# Patient Record
Sex: FEMALE | Race: ASIAN
[De-identification: names, ages, dates, MRNs, and addresses within clinical notes are randomized per-mention and may not be internally consistent; named-entity substitution may affect disease eponyms.]

---

## 2018-05-20 ENCOUNTER — HOSPITAL ENCOUNTER (EMERGENCY)
Dept: HOSPITAL 74 - JERFT | Age: 68
Discharge: HOME | End: 2018-05-20
Payer: COMMERCIAL

## 2018-05-20 VITALS — TEMPERATURE: 98 F | DIASTOLIC BLOOD PRESSURE: 95 MMHG | SYSTOLIC BLOOD PRESSURE: 165 MMHG | HEART RATE: 77 BPM

## 2018-05-20 VITALS — BODY MASS INDEX: 21 KG/M2

## 2018-05-20 DIAGNOSIS — M54.41: Primary | ICD-10-CM

## 2018-05-20 PROCEDURE — 3E0233Z INTRODUCTION OF ANTI-INFLAMMATORY INTO MUSCLE, PERCUTANEOUS APPROACH: ICD-10-PCS

## 2018-05-20 NOTE — PDOC
History of Present Illness





- General


Chief Complaint: Back Pain


Stated Complaint: BACK PAIN


Time Seen by Provider: 05/20/18 09:50





Past History





- Past Medical History


Allergies/Adverse Reactions: 


 Allergies











Allergy/AdvReac Type Severity Reaction Status Date / Time


 


Penicillins Allergy   Verified 05/20/18 09:16











Home Medications: 


Ambulatory Orders





Cyclobenzaprine HCl 5 mg PO HS #10 tablet 05/20/18 








COPD: No





- Suicide/Smoking/Psychosocial Hx


Smoking Status: No


Smoking History: Never smoked


Number of Cigarettes Smoked Daily: 0





*Physical Exam





- Vital Signs


 Last Vital Signs











Temp Pulse Resp BP Pulse Ox


 


 98 F   77   18   165/95   99 


 


 05/20/18 09:13  05/20/18 09:13  05/20/18 09:13  05/20/18 09:13  05/20/18 09:13














*DC/Admit/Observation/Transfer


Diagnosis at time of Disposition: 


Low back pain


Qualifiers:


 Chronicity: acute Back pain laterality: right Sciatica presence: with sciatica 

Sciatica laterality: sciatica of right side Qualified Code(s): M54.41 - Lumbago 

with sciatica, right side








- Discharge Dispostion


Disposition: HOME


Condition at time of disposition: Stable


Decision to Admit order: No





- Referrals


Referrals: 


Elisha Cisneros MD [Primary Care Provider] - 





- Patient Instructions


Printed Discharge Instructions:  DI for Low Back Pain


Additional Instructions: 


You have low back pain due to a muscle spasm. Please take ibuprofen 800 mg 3 

times a day not to exceed 3000 mg a day. You were also prescribed Flexeril. 

Please  take the medication before you go to bed. Do not drive after taking 

this medication as it may make you sleepy. You may use warm compresses on your 

back to help with her symptoms. Please follow-up with your primary care doctor. 

If your symptoms do not resolve in 3-5 days, follow-up with orthopedics. A 

referral has been provided for you.





Return to the emergency department if you have worsening back pain, bladder or 

bowel incontinence, numbness and tingling in her legs, changes in the way you 

walk, or any new or worsening symptoms.





- Post Discharge Activity

## 2018-11-27 ENCOUNTER — HOSPITAL ENCOUNTER (INPATIENT)
Dept: HOSPITAL 74 - JER | Age: 68
LOS: 3 days | Discharge: HOME | DRG: 391 | End: 2018-11-30
Attending: INTERNAL MEDICINE | Admitting: INTERNAL MEDICINE
Payer: COMMERCIAL

## 2018-11-27 VITALS — BODY MASS INDEX: 20.9 KG/M2

## 2018-11-27 DIAGNOSIS — I10: ICD-10-CM

## 2018-11-27 DIAGNOSIS — E11.9: ICD-10-CM

## 2018-11-27 DIAGNOSIS — A09: Primary | ICD-10-CM

## 2018-11-27 DIAGNOSIS — H26.9: ICD-10-CM

## 2018-11-27 DIAGNOSIS — H40.9: ICD-10-CM

## 2018-11-27 DIAGNOSIS — Z79.84: ICD-10-CM

## 2018-11-27 DIAGNOSIS — E78.5: ICD-10-CM

## 2018-11-27 DIAGNOSIS — J18.9: ICD-10-CM

## 2018-11-27 LAB
ALBUMIN SERPL-MCNC: 3.7 G/DL (ref 3.4–5)
ALP SERPL-CCNC: 117 U/L (ref 45–117)
ALT SERPL-CCNC: 53 U/L (ref 13–61)
ANION GAP SERPL CALC-SCNC: 6 MMOL/L (ref 8–16)
AST SERPL-CCNC: 32 U/L (ref 15–37)
BASOPHILS # BLD: 0.3 % (ref 0–2)
BILIRUB SERPL-MCNC: 0.4 MG/DL (ref 0.2–1)
BUN SERPL-MCNC: 7 MG/DL (ref 7–18)
CALCIUM SERPL-MCNC: 10.2 MG/DL (ref 8.5–10.1)
CHLORIDE SERPL-SCNC: 104 MMOL/L (ref 98–107)
CO2 SERPL-SCNC: 28 MMOL/L (ref 21–32)
CREAT SERPL-MCNC: 0.7 MG/DL (ref 0.55–1.3)
DEPRECATED RDW RBC AUTO: 14.9 % (ref 11.6–15.6)
EOSINOPHIL # BLD: 0.5 % (ref 0–4.5)
GLUCOSE SERPL-MCNC: 105 MG/DL (ref 74–106)
HCT VFR BLD CALC: 38.4 % (ref 32.4–45.2)
HGB BLD-MCNC: 13.2 GM/DL (ref 10.7–15.3)
LIPASE SERPL-CCNC: 95 U/L (ref 73–393)
LYMPHOCYTES # BLD: 15 % (ref 8–40)
MCH RBC QN AUTO: 29.6 PG (ref 25.7–33.7)
MCHC RBC AUTO-ENTMCNC: 34.3 G/DL (ref 32–36)
MCV RBC: 86.4 FL (ref 80–96)
MONOCYTES # BLD AUTO: 6.3 % (ref 3.8–10.2)
NEUTROPHILS # BLD: 77.9 % (ref 42.8–82.8)
PLATELET # BLD AUTO: 241 K/MM3 (ref 134–434)
PMV BLD: 9.2 FL (ref 7.5–11.1)
POTASSIUM SERPLBLD-SCNC: 4.4 MMOL/L (ref 3.5–5.1)
PROT SERPL-MCNC: 7.5 G/DL (ref 6.4–8.2)
RBC # BLD AUTO: 4.45 M/MM3 (ref 3.6–5.2)
SODIUM SERPL-SCNC: 137 MMOL/L (ref 136–145)
WBC # BLD AUTO: 14 K/MM3 (ref 4–10)

## 2018-11-27 PROCEDURE — G0378 HOSPITAL OBSERVATION PER HR: HCPCS

## 2018-11-27 NOTE — PDOC
Attending Attestation





- Resident


Resident Name: Travis Awad





- ED Attending Attestation


I have performed the following: I have examined & evaluated the patient, The 

case was reviewed & discussed with the resident, I agree w/resident's findings 

& plan





- HPI


HPI: 





11/27/18 18:59


69 yo F with a hx of DM, HLD, glaucoma, and cataracts presents to the ED with 

diffuse AP, n/v/d x 4 days. no fever. +cough. difficulty tolerating PO intake. 

no urinary sx. +sick contact, niece with recent pneumonia


flu test negative today


11/28/18 09:25








- Physicial Exam


PE: 





11/27/18 18:57





NAD, well appearing, PERRL, EOMI, dry mucus membranes. nl conjunctiva, anicteric

; neck supple. lungs clear, +tachycardic, abdomen soft diffusely tender, no 

rebound or guarding. no CVAT. MYERS x4, no focal neuro deficits. No peripheral 

edema. normal color for ethnicity, WWP.





11/27/18 19:00








- Medical Decision Making





11/27/18 18:57


69 yo F with a hx of DM, HLD, glaucoma, and cataracts presents to the ED with 

nausea, vomiting, and abdominal pain x 4 days, +cough and malaise, unable to 

mica PO.


DDx.Colitis, gastroenteritis, viral syndrome, UTI, pna. intra abdominal 

infection/inflammation. electrolyte/metabolic derangements.





Vital signs reviewed, +tachycardic. check rectal temp, normal sats and BP.


laboratory results and imaging reviewed, basic labs and lytes lipase and LFTs 

pending. EKG, CXR ordered.





ED course: IVF, hydration, zofran pepcid and maalox, tylenol, reassess. no 

focal findings, abdomen diffusely tender, if continues to be sx, CT a/p





Dispo: signed out to overnight attending and team pending reeval, labs and 

workup, reassessment and ultimate dispo.








11/28/18 09:25





11/28/18 09:27

## 2018-11-27 NOTE — PDOC
History of Present Illness





- General


Chief Complaint: Nausea/Vomiting


Stated Complaint: Nausea/Vomiting/FEVER


History Source: Patient


Exam Limitations: No Limitations





- History of Present Illness


Initial Comments: 





11/27/18 22:40


67 yo F with a hx of DM, HLD, glaucoma, and cataracts presents to the ED with 

nausea, vomiting, and abdominal pain since last Friday. She states she traveled 

to [place] by car last Wednesday and began having a sore throat thursday that 

transitioned to fever, N/V, and abdominal pain on Friday. She states she had 

diarrhea throughout the weekend that terminated yesterday. She states the 

abdominal pain is RUQ and LUQ that does not radiate that feels sharp and graded 

as a 2/10 at rest and 6/10 when coughing. Currently, she has vomiting 5x today 

without hematemesis. She had her flu vaccine given last month (she was 

influenza negative at [facility] today on visit) and endorses having recent sick 

contacts (granddaughter has pneumonia and grandson has URI). Denies the 

following: fevers, chills, visual changes, chest pain, SOB, dysuria, hematuria, 

and leg pain/swelling.





Past History





- Past Medical History


Allergies/Adverse Reactions: 


 Allergies











Allergy/AdvReac Type Severity Reaction Status Date / Time


 


Penicillins Allergy   Verified 05/20/18 09:16











Home Medications: 


Ambulatory Orders





Atorvastatin Ca [Lipitor] 10 mg PO HS 11/27/18 


Bimatoprost [Lumigan] 1 drop OU HS 11/27/18 


Cyclosporine [Restasis] 1 each OU HS 11/27/18 


Metformin HCl [Metformin HCl ER] 1,000 mg PO BID 11/27/18 








COPD: No


Diabetes: Yes (NIDM)


Hypercholesterolemia: Yes


Thyroid Disease: Yes





- Immunization History


Immunization Up to Date: Yes





- Suicide/Smoking/Psychosocial Hx


Smoking Status: No


Smoking History: Never smoked


Number of Cigarettes Smoked Daily: 0


Hx Alcohol Use: No


Drug/Substance Use Hx: No


Substance Use Type: None





*Physical Exam





- Vital Signs


 Last Vital Signs











Temp Pulse Resp BP Pulse Ox


 


 99.2 F   107 H  16   127/68   100 


 


 11/27/18 17:57  11/27/18 17:57  11/27/18 17:57  11/27/18 17:57  11/27/18 17:57














Moderate Sedation





- Procedure Monitoring


Vital Signs: 


Procedure Monitoring Vital Signs











Temperature  99.2 F   11/27/18 17:57


 


Pulse Rate  107 H  11/27/18 17:57


 


Respiratory Rate  16   11/27/18 17:57


 


Blood Pressure  127/68   11/27/18 17:57


 


O2 Sat by Pulse Oximetry (%)  100   11/27/18 17:57











ED Treatment Course





- LABORATORY


CBC & Chemistry Diagram: 


 11/27/18 17:15





 11/27/18 17:15





*DC/Admit/Observation/Transfer





- Referrals


Referrals: 


Elisha Cisneros MD [Primary Care Provider] - 





- Patient Instructions





- Post Discharge Activity

## 2018-11-28 LAB
ANION GAP SERPL CALC-SCNC: 5 MMOL/L (ref 8–16)
APPEARANCE UR: CLEAR
BASOPHILS # BLD: 0.4 % (ref 0–2)
BILIRUB UR STRIP.AUTO-MCNC: NEGATIVE MG/DL
BUN SERPL-MCNC: 5 MG/DL (ref 7–18)
CALCIUM SERPL-MCNC: 9.6 MG/DL (ref 8.5–10.1)
CHLORIDE SERPL-SCNC: 104 MMOL/L (ref 98–107)
CO2 SERPL-SCNC: 28 MMOL/L (ref 21–32)
COLOR UR: (no result)
CREAT SERPL-MCNC: 0.6 MG/DL (ref 0.55–1.3)
DEPRECATED RDW RBC AUTO: 14.9 % (ref 11.6–15.6)
EOSINOPHIL # BLD: 1.2 % (ref 0–4.5)
GLUCOSE SERPL-MCNC: 88 MG/DL (ref 74–106)
HCT VFR BLD CALC: 37.9 % (ref 32.4–45.2)
HGB BLD-MCNC: 12.1 GM/DL (ref 10.7–15.3)
KETONES UR QL STRIP: NEGATIVE
LEUKOCYTE ESTERASE UR QL STRIP.AUTO: NEGATIVE
LYMPHOCYTES # BLD: 19.8 % (ref 8–40)
MAGNESIUM SERPL-MCNC: 1.9 MG/DL (ref 1.8–2.4)
MCH RBC QN AUTO: 27.8 PG (ref 25.7–33.7)
MCHC RBC AUTO-ENTMCNC: 31.9 G/DL (ref 32–36)
MCV RBC: 87.2 FL (ref 80–96)
MONOCYTES # BLD AUTO: 10.1 % (ref 3.8–10.2)
NEUTROPHILS # BLD: 68.5 % (ref 42.8–82.8)
NITRITE UR QL STRIP: NEGATIVE
PH UR: 6 [PH] (ref 5–8)
PHOSPHATE SERPL-MCNC: 1.9 MG/DL (ref 2.5–4.9)
PLATELET # BLD AUTO: 235 K/MM3 (ref 134–434)
PMV BLD: 8.4 FL (ref 7.5–11.1)
POTASSIUM SERPLBLD-SCNC: 3.7 MMOL/L (ref 3.5–5.1)
PROT UR QL STRIP: (no result)
PROT UR QL STRIP: NEGATIVE
RBC # BLD AUTO: 4.34 M/MM3 (ref 3.6–5.2)
SODIUM SERPL-SCNC: 138 MMOL/L (ref 136–145)
SP GR UR: 1.03 (ref 1.01–1.03)
UROBILINOGEN UR STRIP-MCNC: NEGATIVE MG/DL (ref 0.2–1)
WBC # BLD AUTO: 8.7 K/MM3 (ref 4–10)

## 2018-11-28 RX ADMIN — INSULIN ASPART SCH: 100 INJECTION, SOLUTION INTRAVENOUS; SUBCUTANEOUS at 06:29

## 2018-11-28 RX ADMIN — LATANOPROST SCH DROP: 50 SOLUTION OPHTHALMIC at 21:59

## 2018-11-28 RX ADMIN — HEPARIN SODIUM SCH UNIT: 5000 INJECTION, SOLUTION INTRAVENOUS; SUBCUTANEOUS at 14:17

## 2018-11-28 RX ADMIN — SODIUM CHLORIDE SCH MLS/HR: 9 INJECTION, SOLUTION INTRAVENOUS at 11:30

## 2018-11-28 RX ADMIN — INSULIN ASPART SCH: 100 INJECTION, SOLUTION INTRAVENOUS; SUBCUTANEOUS at 19:17

## 2018-11-28 RX ADMIN — ATORVASTATIN CALCIUM SCH MG: 20 TABLET, FILM COATED ORAL at 21:59

## 2018-11-28 RX ADMIN — HEPARIN SODIUM SCH UNIT: 5000 INJECTION, SOLUTION INTRAVENOUS; SUBCUTANEOUS at 06:29

## 2018-11-28 RX ADMIN — HEPARIN SODIUM SCH UNIT: 5000 INJECTION, SOLUTION INTRAVENOUS; SUBCUTANEOUS at 21:58

## 2018-11-28 RX ADMIN — GUAIFENESIN AND DEXTROMETHORPHAN HYDROBROMIDE PRN ML: 100; 10 SOLUTION ORAL at 21:59

## 2018-11-28 RX ADMIN — INSULIN ASPART SCH: 100 INJECTION, SOLUTION INTRAVENOUS; SUBCUTANEOUS at 14:58

## 2018-11-28 NOTE — PN
Teaching Attending Note


Name of Resident: Anna Baugh





ATTENDING PHYSICIAN STATEMENT





I saw and evaluated the patient.


I reviewed the resident's note and discussed the case with the resident.


I agree with the resident's findings and plan as documented.








SUBJECTIVE:


abd pain has improved, N/v resolved. diarrhea , stopped yesterday . non 

productive cough 





OBJECTIVE:





NAD 


CV: RRR


Lungs: CTAB. 


Abd: soft, TTP in lower quadrants and suprapubic area 


ext : no edema 








ASSESSMENT AND PLAN:





69 y/o lady  who presented with N/V/D , and was found to have b/l infiltrates 





1- b/l CAP: viral vs bacterial  


- levaquin 


- check legionella Ag, and mycoplasma ABs. r/o atyrpicals 


- monitor 


- rapid flu neg, check flu PCR 





2- N/V/D : gastroenteritis vs due to PNA 


- IVf 


- liquid diet 


- advance as tolerated 


- if diarrhea recurs, check stool studies 





3- HLP : cont statin

## 2018-11-28 NOTE — EKG
Test Reason : 

Blood Pressure : ***/*** mmHG

Vent. Rate : 098 BPM     Atrial Rate : 098 BPM

   P-R Int : 152 ms          QRS Dur : 090 ms

    QT Int : 340 ms       P-R-T Axes : 068 067 029 degrees

   QTc Int : 434 ms

 

NORMAL SINUS RHYTHM

NONSPECIFIC T WAVE ABNORMALITY

ABNORMAL ECG

WHEN COMPARED WITH ECG OF 27-NOV-2018 19:55,

PREVIOUS ECG HAS UNDETERMINED RHYTHM, NEEDS REVIEW

Confirmed by AROLDO PLUNKETT, VU ([id number]) on 11/28/2018 11:42:01 AM

 

Referred By:             Confirmed By:VU KENDRICK MD

## 2018-11-28 NOTE — HP
CHIEF COMPLAINT: n/v and cough





PCP: Elisha Cisneros





HISTORY OF PRESENT ILLNESS:


Patient is a 67 y/o F w/ PMHx DM, HLD, glaucoma, cataracts, p/w 5-6 days n/v/d, 

cough, and abd pain. Just prior to onset of symptoms went on car trip to 

[place]. 1 month prior had travel to [place]. Additionally reports subjective 

fever, chills. Abd pain is sharp, non-radiating, 2/10 at rest and 6/10 when 

coughing. Had flu vaccine 1 month ago. Went to urgent care several days ago and 

reportedly had negative influenza swab. Sick contacts are granddaughter with 

PNA and grandson with URI. Denies vision changes, CP, SOB, dysuria, hematuria, 

swelling. Prior to presentation had 5 episodes NBNB vomiting which prompted 

trip to ED. On presentation tachycardic to 107, WBC 14, labs otherwise wnl. In 

ED given Levofloxacin 750 IV, duoneb, mylanta, IV tylenol, famotidine, 1L NS 

bolus, and 4mg zofran. Symptoms were controlled in ED for several hours, 

patient then had further episode of vomiting, further dose of Zofran given. EKG 

and CXR benign. Abd CT showed no abdominal pathology but identified small 

opacities in lower lung fields. Chest CT w/ contrast was ordered and is 

pending. 





ER course was notable for:


(1) tachycardia, WBC 14, low grade temp 99.2


(2) Abd CT, CXR, EKG benign


(3) Chest CT pending





Recent Travel:


As per HPI





PAST MEDICAL HISTORY:


As per HPI





PAST SURGICAL HISTORY:


As per HPI





Social History:


Smoking: none


Alcohol: none


Drugs: none





Family History:


Allergies





Penicillins Allergy (Verified 05/20/18 09:16)


 








HOME MEDICATIONS:


 Home Medications











 Medication  Instructions  Recorded


 


Atorvastatin Ca [Lipitor] 10 mg PO HS 11/27/18


 


Bimatoprost [Lumigan] 1 drop OU HS 11/27/18


 


Cyclosporine [Restasis] 1 each OU HS 11/27/18


 


Metformin HCl [Metformin HCl ER] 1,000 mg PO BID 11/27/18








REVIEW OF SYSTEMS


As per HPI








PHYSICAL EXAMINATION


 Vital Signs - 24 hr











  11/27/18





  17:57


 


Temperature 99.2 F


 


Pulse Rate 107 H


 


Respiratory 16





Rate 


 


Blood Pressure 127/68


 


O2 Sat by Pulse 100





Oximetry (%) 











GENERAL: A&Ox3, NAD


HEAD: NC/AT


EYES: PERRLA, EOMI


EARS, NOSE, THROAT: Ears normal, nares patent, oropharynx clear without 

exudates. Moist mucous membranes.


NECK: Normal range of motion, supple without lymphadenopathy, JVD, or masses.


LUNGS: Coarse bibasilar crackles


HEART: RRR no m/r/g


ABDOMEN: +bs, soft, non-distended, diffuse tenderness, no masses or organomegaly


MUSCULOSKELETAL: Normal range of motion at all joints. No bony deformities or 

tenderness. No CVA tenderness.


UPPER EXTREMITIES: 2+ pulses, warm, well-perfused. No cyanosis. No clubbing. No 

peripheral edema.


LOWER EXTREMITIES: 2+ pulses, warm, well-perfused. No calf tenderness. No 

peripheral edema. 


NEUROLOGICAL:  CNs, motor, sensory systems w/o focal deficit


PSYCHIATRIC: Cooperative. Good eye contact. Appropriate mood and affect.


SKIN: Warm, dry, normal turgor, no rashes or lesions noted, normal capillary 

refill. 





 Laboratory Results - last 24 hr











  11/27/18 11/27/18 11/28/18





  17:15 17:15 02:07


 


WBC  14.0 H  


 


RBC  4.45  


 


Hgb  13.2  


 


Hct  38.4  


 


MCV  86.4  


 


MCH  29.6  


 


MCHC  34.3  


 


RDW  14.9  


 


Plt Count  241  


 


MPV  9.2  


 


Absolute Neuts (auto)  10.9 H  


 


Neutrophils %  77.9  


 


Lymphocytes %  15.0  


 


Monocytes %  6.3  


 


Eosinophils %  0.5  


 


Basophils %  0.3  


 


Nucleated RBC %  0  


 


Sodium   137 


 


Potassium   4.4 


 


Chloride   104 


 


Carbon Dioxide   28 


 


Anion Gap   6 L 


 


BUN   7 


 


Creatinine   0.7 


 


Creat Clearance w eGFR   > 60 


 


Random Glucose   105 


 


Calcium   10.2 H 


 


Total Bilirubin   0.4 


 


AST   32 


 


ALT   53 


 


Alkaline Phosphatase   117 


 


Total Protein   7.5 


 


Albumin   3.7 


 


Lipase   95 


 


Influenza A (Rapid)    Negative


 


Influenza B (Rapid)    Negative











ASSESSMENT/PLAN:


67 y/o F w/ PMHx DM, HLD, glaucoma, cataracts, p/w 5-6 days n/v/d, cough, and 

abd pain. Elevated WBC and tachycardic on presentation. Possible lower lung 

field opacities, studies are otherwise benign thus far.





#nausea/vomiting/diarrhea/abd pain/cough


-likely viral gastroenteritis


-Chest CT pending to r/o PNA


-no indication for further ABx at this time


-trial of clear liquid diet


-1L LR at 83ml/hr, assess for further IVF needs when complete





#DM


-BGM ACHS


-SSI


-hold home metformin





#HLD


-home atorvastatin





#glaucoma


-home ophthalmologic meds





#FEN


-LR @ 83


-monitor and replete as needed


-clear liquid, advance as tolerated





#PPx


-DVT: heparin subq


-GI: pepcid/zantac PRN





#code


-full





#dispo


-obs








Visit type





- Emergency Visit


Emergency Visit: Yes


ED Registration Date: 11/28/18


Care time: The patient presented to the Emergency Department on the above date 

and was hospitalized for further evaluation of their emergent condition.





- New Patient


This patient is new to me today: Yes


Date on this admission: 11/28/18





- Critical Care


Critical Care patient: No

## 2018-11-28 NOTE — PN
Physical Exam: 


SUBJECTIVE: 


Patient 67 y/o female with a history of DM, HLD, glaucoma, cataracts who is 

here for pneumonia. Patient reports she is still nauseous and had four episodes 

of diarrhea overnight. Reports she is not feeling better but wishes to go home. 

Interpretation through patients daughters.








OBJECTIVE:





  Vital Signs











Temperature  98.6 F   11/28/18 05:19


 


Pulse Rate  89   11/28/18 05:19


 


Respiratory Rate  19   11/28/18 05:19


 


Blood Pressure  128/78   11/28/18 05:19


 


O2 Sat by Pulse Oximetry (%)  100   11/27/18 17:57




















GENERAL: The patient is awake, alert, and fully oriented, in no acute distress.


HEAD: Normal with no signs of trauma.


EYES: PERRL, extraocular movements intact, sclera anicteric, conjunctiva clear. 

No ptosis. 


ENT:  moist mucous membranes.


LUNGS: Breath sounds equal, clear to auscultation bilaterally, no wheezes, no 

crackles, no accessory muscle use. 


HEART: Regular rate and rhythm, S1, S2 without murmur, rub or gallop.


ABDOMEN: tender to light palpation


EXTREMITIES: 2+ pulses, warm, well-perfused, no edema. 


PSYCH: Normal mood, normal affect.


SKIN: Warm, dry, normal turgor, no rashes or lesions noted























Active Medications





 CBC, BMP





 11/28/18 05:10 





 11/28/18 05:10 





Atorvastatin Calcium (Lipitor -)  10 mg PO HS DENZEL


Heparin Sodium (Porcine) (Heparin -)  5,000 unit SQ TID DENZEL


   Last Admin: 11/28/18 06:29 Dose:  5,000 unit


Sodium Chloride (Normal Saline -)  1,000 mls @ 42 mls/hr IV ASDIR DENZEL


Insulin Aspart (Novolog Vial Sliding Scale -)  1 vial SQ ACHS DENZEL; Protocol


   Last Admin: 11/28/18 06:29 Dose:  Not Given


Latanoprost (Xalatan 0.005% Eye Drops -)  1 drop OU HS DENZEL


Non-Formulary Medication (Cyclosporine [Restasis])  1 each OU HS DENZEL


Ondansetron HCl (Zofran Injection)  8 mg IVPB Q8H PRN


   PRN Reason: NAUSEA











ASSESSMENT/PLAN:


Patient 67 y/o female with a history of DM, HLD, glaucoma, cataracts who is 

here for pneumonia.





#Pneumonia


   - Abd CT: mild L adrenal thickening, small opacities at lower lung field


   - Chest CT: patchy infiltrate identified within right upper lobe, right 

middle lobe, and both bases, no pleural effusion or pneumothorax


   - received one time dose levofloxacin in ED


   - Azithromycin 500 mg today, 250 mg tomorrow


   - Ceftriaxone 1 gm q 24, ( Pt PCN allg is nausea, <5% crossover reaction 

with cephalosporin)


   - Begin Levaquin tomorrow


   - zofran 4mg q8h for nausea 


   - f/u bcx and urine cx


   - f/u flu swab, f/u urine antigens for pneumonia





#DM


   - SS


   - BGM





#glaucoma


   -  continue cyclosporine and latanaprost





#DVT ppx


   - heparin TID





#hyperlipidmeia


   - atorvastatin 20 mg po hs





#FEN


   - low phosp 1.9, replete wit packet


   - NS @ 42





Dispo: f/u tomorrow 





Visit type





- Emergency Visit


Emergency Visit: No





- New Patient


This patient is new to me today: Yes


Date on this admission: 11/28/18





- Critical Care


Critical Care patient: No

## 2018-11-28 NOTE — PN
Teaching Attending Note


Name of Resident: Casper Benoit





ATTENDING PHYSICIAN STATEMENT





I saw and evaluated the patient.


I reviewed the resident's note and discussed the case with the resident.


I agree with the resident's findings and plan as documented.








SUBJECTIVE:


Seen and examined; please refer to resident's note for additional historical 

information.  In summation this is a 69 y/o female presenting to the hospital 

from home with her daughter for several days of nausea/vomitting/diarrhea which 

has been worsening over the past week when she returned from a road trip to [place].

  Furthermore she was in [place] the prior month but had none of these sx when 

there.  No bone pain, no rash, no conjunctuvitis.  Vomiting is bilious.  

Diarrhea is loose to watery. Some occasional abdominal pain worse when she 

coughs; that pain is diffuse throughout her abdomen.  Per the daughter she gets 

these sx yearly and will come to the ER get abx and get sent home.  Initially 

tachy on presentation to 107 with WBC 14; CT abdomen and pelvis done in ER was 

not revealing for intraabdominal pathology but did recommend CT chest given the 

findings of small opacities at the b/l lower lung fields; this is pending.  She 

is currently hemodynamically stable, afebrile, not requiring O2.  Admission 

requested to the medicine service. 





10 sys ROS done and negative aside from HPI


PMH and PSH reviewed


FH asked and noncontributory


Socially non-smoker, non-drinker, travel hx discussed








OBJECTIVE:


Vs, labs, imaging reviewed


NAD, AAOx3, resting in bed


RRR s1/2 no mgr


Lungs CTAB with sym expansion


NT ND +BS


CN2-12 grossly intact


NC AT EOMI PERRLA





Labs show WBC 14 with increased ANC, negative flu wab, unremarkable chemistry, 

unremarkable UA.  Rest of CBC unremarkable.  


CT chest pending


CT ab/pelv shows small opacities in the b/l lower lungs, thickened L-adrenal 

gland medial limb








ASSESSMENT AND PLAN:


Mrs. Still is a 69 y/o presenting with cough, nausea, vomitting, diarrhea, 

with a recent travel history; found to have b/l opacities in the lower lungs 

with CT pending





1) Bilateral Lower Lung Opacities, potential pneumonia


-CXR nonrevealing but CT pending; seen on initial CT abdomen and pelvis. 


-Flu swab negative; will check sputum cx, followup blood cx.


-Empiric Ceftriaxone/azithromycin until we confirm the presence or abscence of 

infiltrative findings; would thus tx for CAP.  This could, however, be a viral 

syndrome.





2) Nausea/Vomitting/Diarrhea


-Could potentially be due to viral syndrome vs. referred issues from #1 if 

indeed pneumonia


-Check lactoferrin, she is low risk for Cdif but if she has profuse diarrhea 

indicative of Cdif colitis will check an assay


-If she has severe diarrhea here, send for parasite exam, culture.  At risk due 

to travel to [place]


-Clear liquid diet; maintenance LR until established that she can tolerate 

food.  PRN zofran





3) Glaucoma


-Continue home medications





4) DM


-Hold PO meds; give ssi





5) HLD


-Continue home statin; check lipids OP





Full Code

## 2018-11-28 NOTE — PDOC
*Physical Exam





- Vital Signs


 Last Vital Signs











Temp Pulse Resp BP Pulse Ox


 


 99.2 F   107 H  16   127/68   100 


 


 11/27/18 17:57  11/27/18 17:57  11/27/18 17:57  11/27/18 17:57  11/27/18 17:57














- Physical Exam


General Appearance: Yes: Nourished


Neck: positive: Trachea midline


Respiratory/Chest: positive: Other (faint crackles bilat bases, normal effort)


Cardiovascular: positive: Regular Rhythm, Regular Rate, S1, S2


Gastrointestinal/Abdominal: positive: Normal Bowel Sounds, Flat, Soft.  negative

: Tender





**Heart Score/ECG Review


  ** #1


General ECG Interpretation: Sinus Rhythm, Normal Rate (98), Normal Intervals, 

No acute ischemic changes


Compared to previous ECG there are: No significant change





11/28/18 01:54


TWI III, flattened AVF. 





ED Treatment Course





- LABORATORY


CBC & Chemistry Diagram: 


 11/27/18 17:15





 11/27/18 17:15





- ADDITIONAL ORDERS


Additional order review: 


 Laboratory  Results











  11/27/18





  17:15


 


Sodium  137


 


Potassium  4.4


 


Chloride  104


 


Carbon Dioxide  28


 


Anion Gap  6 L


 


BUN  7


 


Creatinine  0.7


 


Creat Clearance w eGFR  > 60


 


Random Glucose  105


 


Calcium  10.2 H


 


Total Bilirubin  0.4


 


AST  32


 


ALT  53


 


Alkaline Phosphatase  117


 


Total Protein  7.5


 


Albumin  3.7


 


Lipase  95








 











  11/27/18





  17:15


 


RBC  4.45


 


MCV  86.4


 


MCHC  34.3


 


RDW  14.9


 


MPV  9.2


 


Neutrophils %  77.9


 


Lymphocytes %  15.0


 


Monocytes %  6.3


 


Eosinophils %  0.5


 


Basophils %  0.3














- Medications


Given in the ED: 


ED Medications














Discontinued Medications














Generic Name Dose Route Start Last Admin





  Trade Name Freddyq  PRN Reason Stop Dose Admin


 


Acetaminophen  1,000 mg  11/27/18 18:46  11/27/18 19:51





  Ofirmev Injection -  IVPB  11/27/18 18:47  1,000 mg





  ONCE ONE   Administration





     





     





     





     


 


Al Hydroxide/Mg Hydroxide  30 ml  11/27/18 18:46  11/27/18 19:51





  Mylanta Oral Suspension -  PO  11/27/18 18:47  30 ml





  ONCE ONE   Administration





     





     





     





     


 


Famotidine/Sodium Chloride  20 mg in 50 mls @ 100 mls/hr  11/27/18 18:46  11/27/ 18 19:52





  Pepcid 20 Mg Premixed Ivpb -  IVPB  11/27/18 19:15  100 mls/hr





  ONCE ONE   Administration





     





     





     





     


 


Sodium Chloride  1,000 mls @ 1,000 mls/hr  11/27/18 18:46  11/27/18 19:51





  Normal Saline -  IV  11/27/18 19:45  1,000 mls/hr





  ASDIR STA   Administration





     





     





     





     


 


Ondansetron HCl  4 mg  11/27/18 18:47  11/27/18 19:52





  Zofran Injection  IVPUSH  11/27/18 18:48  4 mg





  ONCE ONE   Administration





     





     





     





     














Medical Decision Making





- Medical Decision Making





11/28/18 01:22


69 yo F wit h/o DM , second hand smoke exposure, here with c/o n/v x 4 days.  

last emesis was today at 4 pm.  has had cough, subjective chills. no abd pain.  

pt was seen at urgent care few days ago, had negative flu swab.  


ws signed out to me awaiting imaging, and reasessment.  pt states has not 

tolerate po .  ct a/p negative for abd pathology. noted acute . vs chronic 

inflammatory vs. infectious change at lung bases.


will treat with neb, iv abx.  due DM, elevated WBC 14, trial po. admit for iv 

abx. 


11/28/18 01:55


d/w with dr. mcduffie, will admit to hospitalist





*DC/Admit/Observation/Transfer


Diagnosis at time of Disposition: 


 Intractable vomiting, Pneumonia








- Discharge Dispostion


Decision to Admit order: Yes





- Referrals


Referrals: 


Elisha Cisneros MD [Primary Care Provider] - 





- Patient Instructions





- Post Discharge Activity

## 2018-11-29 LAB
ANION GAP SERPL CALC-SCNC: 7 MMOL/L (ref 8–16)
BUN SERPL-MCNC: 3 MG/DL (ref 7–18)
CALCIUM SERPL-MCNC: 9.8 MG/DL (ref 8.5–10.1)
CHLORIDE SERPL-SCNC: 110 MMOL/L (ref 98–107)
CO2 SERPL-SCNC: 26 MMOL/L (ref 21–32)
CREAT SERPL-MCNC: 0.6 MG/DL (ref 0.55–1.3)
DEPRECATED RDW RBC AUTO: 15.1 % (ref 11.6–15.6)
GLUCOSE SERPL-MCNC: 82 MG/DL (ref 74–106)
HCT VFR BLD CALC: 35.1 % (ref 32.4–45.2)
HGB BLD-MCNC: 11 GM/DL (ref 10.7–15.3)
MCH RBC QN AUTO: 27.8 PG (ref 25.7–33.7)
MCHC RBC AUTO-ENTMCNC: 31.4 G/DL (ref 32–36)
MCV RBC: 88.4 FL (ref 80–96)
PLATELET # BLD AUTO: 226 K/MM3 (ref 134–434)
PMV BLD: 8.9 FL (ref 7.5–11.1)
POTASSIUM SERPLBLD-SCNC: 3.7 MMOL/L (ref 3.5–5.1)
RBC # BLD AUTO: 3.96 M/MM3 (ref 3.6–5.2)
SODIUM SERPL-SCNC: 143 MMOL/L (ref 136–145)
WBC # BLD AUTO: 7.1 K/MM3 (ref 4–10)

## 2018-11-29 RX ADMIN — SODIUM CHLORIDE SCH MLS/HR: 9 INJECTION, SOLUTION INTRAVENOUS at 02:25

## 2018-11-29 RX ADMIN — INSULIN ASPART SCH: 100 INJECTION, SOLUTION INTRAVENOUS; SUBCUTANEOUS at 11:55

## 2018-11-29 RX ADMIN — HEPARIN SODIUM SCH UNIT: 5000 INJECTION, SOLUTION INTRAVENOUS; SUBCUTANEOUS at 21:13

## 2018-11-29 RX ADMIN — GUAIFENESIN AND DEXTROMETHORPHAN HYDROBROMIDE PRN ML: 100; 10 SOLUTION ORAL at 14:37

## 2018-11-29 RX ADMIN — LATANOPROST SCH DROP: 50 SOLUTION OPHTHALMIC at 21:14

## 2018-11-29 RX ADMIN — HEPARIN SODIUM SCH UNIT: 5000 INJECTION, SOLUTION INTRAVENOUS; SUBCUTANEOUS at 06:03

## 2018-11-29 RX ADMIN — GUAIFENESIN AND DEXTROMETHORPHAN HYDROBROMIDE PRN ML: 100; 10 SOLUTION ORAL at 21:14

## 2018-11-29 RX ADMIN — INSULIN ASPART SCH: 100 INJECTION, SOLUTION INTRAVENOUS; SUBCUTANEOUS at 16:35

## 2018-11-29 RX ADMIN — SODIUM CHLORIDE SCH MLS/HR: 9 INJECTION, SOLUTION INTRAVENOUS at 14:39

## 2018-11-29 RX ADMIN — HEPARIN SODIUM SCH UNIT: 5000 INJECTION, SOLUTION INTRAVENOUS; SUBCUTANEOUS at 14:41

## 2018-11-29 RX ADMIN — INSULIN ASPART SCH: 100 INJECTION, SOLUTION INTRAVENOUS; SUBCUTANEOUS at 06:17

## 2018-11-29 RX ADMIN — ATORVASTATIN CALCIUM SCH MG: 20 TABLET, FILM COATED ORAL at 21:14

## 2018-11-29 NOTE — PN
Teaching Attending Note


Name of Resident: Anna Baugh





ATTENDING PHYSICIAN STATEMENT





I saw and evaluated the patient.


I reviewed the resident's note and discussed the case with the resident.


I agree with the resident's findings and plan as documented.








SUBJECTIVE:


No fever or chills. has mild nausea still but no diarrhea, or vomiting. abd 

pain has improved . had fever last night 





OBJECTIVE:


NAD 


CV: RRR


Lungs: CTAB. 


Abd: soft, TTP in all quadrants


Ext : no edema 








ASSESSMENT AND PLAN:





69 y/o lady  who presented with N/V/D , and was found to have b/l infiltrates 





1- b/l CAP: viral vs bacterial  . monitor fever curve 


- cont levaquin day 2


- legionella Ag ( neg ) , follow mycoplasma ABs.


- follow flu PCR 





2- N/V/D: gastroenteritis vs due to PNA 


- IVf 


-advance diet  


- if diarrhea recurs, check stool studies 





3- HLP : cont statin











possible dc tomorrow

## 2018-11-29 NOTE — PN
Physical Exam: 


SUBJECTIVE:


Patient 69 y/o female with a history of DM, HLD, glaucoma, cataracts who is 

here for pneumonia. Patient reports she is still nauseous, but denies vomiting 

or diarrhea overnight. Patient had temperature overnight and was given tylenol. 








OBJECTIVE:


 Vital Signs











Temperature  98 F   11/29/18 13:49


 


Pulse Rate  89   11/29/18 13:49


 


Respiratory Rate  18   11/29/18 13:49


 


Blood Pressure  118/73   11/29/18 13:49


 


O2 Sat by Pulse Oximetry (%)  97   11/29/18 03:00














GENERAL: The patient is awake, alert, and fully oriented, in no acute distress.


HEAD: Normal with no signs of trauma.


EYES: PERRL, extraocular movements intact, sclera anicteric, conjunctiva clear. 

No ptosis. 


ENT:  moist mucous membranes.


LUNGS: Breath sounds equal, clear to auscultation bilaterally, no wheezes, no 

crackles, no accessory muscle use. 


HEART: Regular rate and rhythm, S1, S2 without murmur, rub or gallop.


ABDOMEN: tender to light palpation


EXTREMITIES: 2+ pulses, warm, well-perfused, no edema. 


PSYCH: Normal mood, normal affect.


SKIN: Warm, dry, normal turgor, no rashes or lesions noted











 Laboratory Results - last 24 hr


 CBC, BMP





 11/29/18 06:30 





 11/29/18 06:30 




















Active Medications





Atorvastatin Calcium (Lipitor -)  20 mg PO HS DENZEL


   Last Admin: 11/28/18 21:59 Dose:  20 mg


Guaifenesin (Diabetic Tussin Dm -)  5 ml PO Q4H PRN


   PRN Reason: COUGH


   Last Admin: 11/28/18 21:59 Dose:  5 ml


Heparin Sodium (Porcine) (Heparin -)  5,000 unit SQ TID DENZEL


   Last Admin: 11/29/18 06:03 Dose:  5,000 unit


Sodium Chloride (Normal Saline -)  1,000 mls @ 42 mls/hr IV ASDIR DENZEL


   Last Admin: 11/29/18 02:25 Dose:  42 mls/hr


Insulin Aspart (Novolog Vial Sliding Scale -)  1 vial SQ TIDAC [facility]; Protocol


Latanoprost (Xalatan 0.005% Eye Drops -)  1 drop OU HS DENZEL


   Last Admin: 11/28/18 21:59 Dose:  1 drop


Non-Formulary Medication (Cyclosporine [Restasis])  1 each OU HS DENZEL


Ondansetron HCl (Zofran Injection)  4 mg IVPB Q8H PRN


   PRN Reason: NAUSEA


   Last Admin: 11/29/18 08:24 Dose:  4 mg











ASSESSMENT/PLAN:


Patient 69 y/o female with a history of DM, HLD, glaucoma, cataracts who is 

here for pneumonia.





#Pneumonia


   - Abd CT: mild L adrenal thickening, small opacities at lower lung field


   - Chest CT: patchy infiltrate identified within right upper lobe, right 

middle lobe, and both bases, no pleural effusion or pneumothorax


   - continue Levaquin, day 3 of abs


   - zofran 4mg q8h for nausea 


   - f/u bcx and urine cx, no growth yet 


   - RSV swab pending, urnie antigen pending





#DM


   - SS, loose do not give insulin if sugar 200 


   - BGM





#glaucoma


   -  continue cyclosporine and latanaprost





#DVT ppx


   - heparin TID





#hyperlipidmeia


   - atorvastatin 20 mg po hs





#FEN


   - NS @ 42


   - diabetic diet





Dispo: f/u tomorrow 








Visit type





- Emergency Visit


Emergency Visit: No





- New Patient


This patient is new to me today: No





- Critical Care


Critical Care patient: No





- Discharge Referral


Referred to [facility] No

## 2018-11-30 VITALS — SYSTOLIC BLOOD PRESSURE: 154 MMHG | DIASTOLIC BLOOD PRESSURE: 82 MMHG | HEART RATE: 92 BPM

## 2018-11-30 VITALS — TEMPERATURE: 98.1 F

## 2018-11-30 RX ADMIN — GUAIFENESIN AND DEXTROMETHORPHAN HYDROBROMIDE PRN ML: 100; 10 SOLUTION ORAL at 01:06

## 2018-11-30 RX ADMIN — HEPARIN SODIUM SCH: 5000 INJECTION, SOLUTION INTRAVENOUS; SUBCUTANEOUS at 14:17

## 2018-11-30 RX ADMIN — Medication SCH EACH: at 01:04

## 2018-11-30 RX ADMIN — INSULIN ASPART SCH: 100 INJECTION, SOLUTION INTRAVENOUS; SUBCUTANEOUS at 06:21

## 2018-11-30 RX ADMIN — HEPARIN SODIUM SCH UNIT: 5000 INJECTION, SOLUTION INTRAVENOUS; SUBCUTANEOUS at 06:15

## 2018-11-30 RX ADMIN — INSULIN ASPART SCH: 100 INJECTION, SOLUTION INTRAVENOUS; SUBCUTANEOUS at 12:13

## 2018-11-30 NOTE — DS
Physical Exam: 


SUBJECTIVE:


Patient 67 y/o female with a history of DM, HLD, glaucoma, cataracts who is 

here for pneumonia. Patient reports she is breathing better, and has not had 

any episodes of nausea or vomiting. No acute events overnight. 








OBJECTIVE:





  Vital Signs











Temperature  98.1 F   11/30/18 06:00


 


Pulse Rate  80   11/30/18 06:00


 


Respiratory Rate  18   11/30/18 06:00


 


Blood Pressure  110/60   11/30/18 06:00


 


O2 Sat by Pulse Oximetry (%)  98   11/29/18 21:00

















PHYSICAL EXAM





GENERAL: The patient is awake, alert, and fully oriented, in no acute distress.


HEAD: Normal with no signs of trauma.


EYES: PERRL, extraocular movements intact, sclera anicteric, conjunctiva clear. 

No ptosis. 


ENT:  moist mucous membranes.


LUNGS: Breath sounds equal, clear to auscultation bilaterally, no wheezes, no 

crackles, no accessory muscle use. 


HEART: Regular rate and rhythm, S1, S2 without murmur, rub or gallop.


ABDOMEN: tender to light palpation


EXTREMITIES: 2+ pulses, warm, well-perfused, no edema. 


PSYCH: Normal mood, normal affect.


SKIN: Warm, dry, normal turgor, no rashes or lesions noted





LABS


 Laboratory Results - last 24 hr











  11/29/18 11/29/18 11/30/18





  16:34 21:24 06:17


 


POC Glucometer  93  111  87














  11/30/18





  12:06


 


POC Glucometer  83











HOSPITAL COURSE:





Date of Admission:11/28/18





Patient was admitted for pneumonia.  Patient treated with IV antibiotics. 

Patients symptoms improved, vitals stable. Patient instructed to follow up with 

her PCP and have a repeat CT in 6 weeks.





blood cx: negative


Ucx: negative


RSV: negative


Salmonella/Shigella : non lactose fermenting gnb


Yersnia, Vibrio, Campylobacter all negative


Abd CT: mild L adrenal thickening, small opacities at lower lung field


Chest CT: patchy infiltrate identified within right upper lobe, right middle 

lobe, and both bases, no pleural effusion or pneumothorax





Date of Discharge: 11/30/18











Minutes to complete discharge: 35





Discharge Summary


Reason For Visit: INTRACTABLE VOMITING,PNEUMONIA


Condition: Improved





- Instructions


Diet, Activity, Other Instructions: 


You were admitted to the hospital for a pneumonia. We treated this pneumonia 

with antibiotics.





To complete treatment of your pneumonia please take:


Levaquin 500 mg daily  by mouth for two more days





Please have a Chest CT done in 6 weeks to follow up imaging of your lungs.





Please resume your home medications as prescribed. 





Please follow up with your primary care physician within one week.





Please return to the Emergency Department if you have shortness of breath, fever

, chest pain, headache, nausea, or vomiting.


Referrals: 


Elisha Cisneros MD [Primary Care Provider] - 


Disposition: HOME





- Home Medications


Comprehensive Discharge Medication List: 


Ambulatory Orders





Bimatoprost [Lumigan] 1 drop OU HS 11/27/18 


Atorvastatin Ca [Lipitor] 20 mg PO DAILY 11/28/18 


Dorzolamide HCl/Timolol Maleat [Cosopt Eye Drops] 10 ml OU DAILY 11/28/18 


Metformin HCl [Metformin HCl ER] 500 mg PO BID 11/28/18 


Miscellaneous Medical Supply [Outpatient Order] 1 each  ASDIR #1 misc 11/30/ 18 


levoFLOXacin [Levaquin -] 500 mg PO DAILY@0600 #2 tablet 11/30/18 








This patient is new to me today: No


Emergency Visit: No


Critical Care patient: No





- Discharge Referral


Referred to [facility] No

## 2018-11-30 NOTE — PN
Teaching Attending Note


Name of Resident: Anna Baugh





ATTENDING PHYSICIAN STATEMENT





I saw and evaluated the patient.


I reviewed the resident's note and discussed the case with the resident.


I agree with the resident's findings and plan as documented.








SUBJECTIVE:


No fever or chills. No abd pain , no N/V/d . mild no n productive cough 





OBJECTIVE:


NAD 


CV: RRR


Lungs:  L mid lung field crackles 


Abd: soft, TTP in all quadrants


Ext : no edema 








ASSESSMENT AND PLAN:





67 y/o lady  who presented with N/V/D , and was found to have b/l infiltrates 





1- b/l CAP: viral vs bacterial. resolution of fever 


- cont levaquin day 3/5. switch to po 


- legionella Ag ( neg ) , mycoplasma ABs. still pending 


- Flu PCR pending 


- need CT of chest in 6 weeks. 





2- N/V/D: gastroenteritis vs due to PNA 


- resolved. 


- stool cx with  G- organisms . could be connie. any way she is on levaquin and 

her diarrhea stopped 





3- HLP: cont statin. 








DC home,. plan d/w he rand her son at bed side

## 2021-04-21 ENCOUNTER — HOSPITAL ENCOUNTER (EMERGENCY)
Dept: HOSPITAL 74 - JER | Age: 71
Discharge: HOME | End: 2021-04-21
Payer: COMMERCIAL

## 2021-04-21 VITALS — SYSTOLIC BLOOD PRESSURE: 150 MMHG | TEMPERATURE: 98.1 F | HEART RATE: 81 BPM | DIASTOLIC BLOOD PRESSURE: 85 MMHG

## 2021-04-21 VITALS — BODY MASS INDEX: 21.9 KG/M2

## 2021-04-21 DIAGNOSIS — R07.9: Primary | ICD-10-CM

## 2021-04-21 LAB
ALBUMIN SERPL-MCNC: 4.4 G/DL (ref 3.4–5)
ALP SERPL-CCNC: 102 U/L (ref 45–117)
ALT SERPL-CCNC: 39 U/L (ref 13–61)
ANION GAP SERPL CALC-SCNC: 6 MMOL/L (ref 8–16)
AST SERPL-CCNC: 21 U/L (ref 15–37)
BASOPHILS # BLD: 0.4 % (ref 0–2)
BILIRUB SERPL-MCNC: 0.3 MG/DL (ref 0.2–1)
BUN SERPL-MCNC: 11.8 MG/DL (ref 7–18)
CALCIUM SERPL-MCNC: 12.6 MG/DL (ref 8.5–10.1)
CHLORIDE SERPL-SCNC: 104 MMOL/L (ref 98–107)
CO2 SERPL-SCNC: 29 MMOL/L (ref 21–32)
CREAT SERPL-MCNC: 0.8 MG/DL (ref 0.55–1.3)
DEPRECATED RDW RBC AUTO: 14.9 % (ref 11.6–15.6)
EOSINOPHIL # BLD: 3.4 % (ref 0–4.5)
GLUCOSE SERPL-MCNC: 86 MG/DL (ref 74–106)
HCT VFR BLD CALC: 39.5 % (ref 32.4–45.2)
HGB BLD-MCNC: 12.9 GM/DL (ref 10.7–15.3)
LYMPHOCYTES # BLD: 26.2 % (ref 8–40)
MCH RBC QN AUTO: 28.8 PG (ref 25.7–33.7)
MCHC RBC AUTO-ENTMCNC: 32.8 G/DL (ref 32–36)
MCV RBC: 87.9 FL (ref 80–96)
MONOCYTES # BLD AUTO: 5.9 % (ref 3.8–10.2)
NEUTROPHILS # BLD: 64.1 % (ref 42.8–82.8)
PLATELET # BLD AUTO: 283 K/MM3 (ref 134–434)
PMV BLD: 9.5 FL (ref 7.5–11.1)
PROT SERPL-MCNC: 8.6 G/DL (ref 6.4–8.2)
RBC # BLD AUTO: 4.49 M/MM3 (ref 3.6–5.2)
SODIUM SERPL-SCNC: 139 MMOL/L (ref 136–145)
WBC # BLD AUTO: 13 K/MM3 (ref 4–10)

## 2022-06-20 ENCOUNTER — RX ONLY (RX ONLY)
Age: 72
End: 2022-06-20

## 2022-06-20 ENCOUNTER — OFFICE (OUTPATIENT)
Dept: URBAN - METROPOLITAN AREA CLINIC 30 | Facility: CLINIC | Age: 72
Setting detail: OPHTHALMOLOGY
End: 2022-06-20
Payer: MEDICARE

## 2022-06-20 DIAGNOSIS — H40.1132: ICD-10-CM

## 2022-06-20 DIAGNOSIS — Z96.1: ICD-10-CM

## 2022-06-20 DIAGNOSIS — E11.9: ICD-10-CM

## 2022-06-20 DIAGNOSIS — H47.233: ICD-10-CM

## 2022-06-20 PROCEDURE — 92083 EXTENDED VISUAL FIELD XM: CPT | Performed by: OPHTHALMOLOGY

## 2022-06-20 PROCEDURE — 76514 ECHO EXAM OF EYE THICKNESS: CPT | Performed by: OPHTHALMOLOGY

## 2022-06-20 PROCEDURE — 92004 COMPRE OPH EXAM NEW PT 1/>: CPT | Performed by: OPHTHALMOLOGY

## 2022-06-20 PROCEDURE — 92020 GONIOSCOPY: CPT | Performed by: OPHTHALMOLOGY

## 2022-06-20 ASSESSMENT — REFRACTION_MANIFEST
OD_SPHERE: -1.00
OD_CYLINDER: +0.50
OS_VA1: 20/30-2
OS_AXIS: 75
OD_VA1: 20/25-1
OS_CYLINDER: +0.75
OD_AXIS: 140
OS_SPHERE: -1.50

## 2022-06-20 ASSESSMENT — PACHYMETRY
OD_CT_CORRECTION: -1
OD_CT_UM: 557
OS_CT_CORRECTION: -1
OS_CT_UM: 557

## 2022-06-20 ASSESSMENT — SPHEQUIV_DERIVED
OD_SPHEQUIV: -0.75
OS_SPHEQUIV: -1.125
OS_SPHEQUIV: -1.125
OD_SPHEQUIV: -0.75

## 2022-06-20 ASSESSMENT — REFRACTION_AUTOREFRACTION
OS_CYLINDER: +0.75
OD_CYLINDER: +0.50
OD_SPHERE: -1.00
OS_SPHERE: -1.50
OS_AXIS: 75
OD_AXIS: 140

## 2022-06-20 ASSESSMENT — TONOMETRY
OS_IOP_MMHG: 19
OD_IOP_MMHG: 17

## 2022-06-20 ASSESSMENT — VISUAL ACUITY
OS_BCVA: 20/40-2
OD_BCVA: 20/60-2

## 2022-06-20 ASSESSMENT — CONFRONTATIONAL VISUAL FIELD TEST (CVF)
OS_FINDINGS: FULL
OD_FINDINGS: FULL

## 2023-01-06 ENCOUNTER — OFFICE (OUTPATIENT)
Dept: URBAN - METROPOLITAN AREA CLINIC 30 | Facility: CLINIC | Age: 73
Setting detail: OPHTHALMOLOGY
End: 2023-01-06
Payer: COMMERCIAL

## 2023-01-06 DIAGNOSIS — H40.1132: ICD-10-CM

## 2023-01-06 DIAGNOSIS — Z96.1: ICD-10-CM

## 2023-01-06 DIAGNOSIS — H47.233: ICD-10-CM

## 2023-01-06 DIAGNOSIS — H52.4: ICD-10-CM

## 2023-01-06 DIAGNOSIS — E11.9: ICD-10-CM

## 2023-01-06 PROCEDURE — 92250 FUNDUS PHOTOGRAPHY W/I&R: CPT | Performed by: OPHTHALMOLOGY

## 2023-01-06 PROCEDURE — 92014 COMPRE OPH EXAM EST PT 1/>: CPT | Performed by: OPHTHALMOLOGY

## 2023-01-06 PROCEDURE — 92015 DETERMINE REFRACTIVE STATE: CPT | Performed by: OPHTHALMOLOGY

## 2023-01-06 ASSESSMENT — REFRACTION_AUTOREFRACTION
OD_CYLINDER: +0.50
OS_AXIS: 75
OD_AXIS: 140
OS_CYLINDER: +0.75
OD_SPHERE: -1.00
OS_SPHERE: -1.50

## 2023-01-06 ASSESSMENT — REFRACTION_MANIFEST
OS_SPHERE: -1.50
OS_AXIS: 75
OD_AXIS: 140
OS_VA1: 20/40
OS_CYLINDER: +0.75
OD_CYLINDER: +0.50
OS_ADD: +2.50
OD_ADD: +2.50
OD_VA1: 20/30
OD_SPHERE: -1.00

## 2023-01-06 ASSESSMENT — SPHEQUIV_DERIVED
OS_SPHEQUIV: -1.125
OD_SPHEQUIV: -0.75
OD_SPHEQUIV: -0.75
OS_SPHEQUIV: -1.125

## 2023-01-06 ASSESSMENT — PACHYMETRY
OD_CT_UM: 557
OS_CT_CORRECTION: -1
OD_CT_CORRECTION: -1
OS_CT_UM: 557

## 2023-01-06 ASSESSMENT — CONFRONTATIONAL VISUAL FIELD TEST (CVF)
OS_FINDINGS: FULL
OD_FINDINGS: FULL

## 2023-01-06 ASSESSMENT — TONOMETRY
OS_IOP_MMHG: 17
OD_IOP_MMHG: 17

## 2023-01-06 ASSESSMENT — REFRACTION_CURRENTRX
OD_OVR_VA: 20/
OS_OVR_VA: 20/

## 2023-01-06 ASSESSMENT — VISUAL ACUITY
OS_BCVA: 20/30
OD_BCVA: 20/40

## 2023-07-07 ENCOUNTER — OFFICE (OUTPATIENT)
Dept: URBAN - METROPOLITAN AREA CLINIC 30 | Facility: CLINIC | Age: 73
Setting detail: OPHTHALMOLOGY
End: 2023-07-07
Payer: COMMERCIAL

## 2023-07-07 ENCOUNTER — RX ONLY (RX ONLY)
Age: 73
End: 2023-07-07

## 2023-07-07 DIAGNOSIS — H40.1132: ICD-10-CM

## 2023-07-07 DIAGNOSIS — Z96.1: ICD-10-CM

## 2023-07-07 DIAGNOSIS — E11.9: ICD-10-CM

## 2023-07-07 DIAGNOSIS — H47.233: ICD-10-CM

## 2023-07-07 PROBLEM — H16.223: Status: ACTIVE | Noted: 2023-07-07

## 2023-07-07 PROCEDURE — 92014 COMPRE OPH EXAM EST PT 1/>: CPT | Performed by: OPHTHALMOLOGY

## 2023-07-07 PROCEDURE — 92083 EXTENDED VISUAL FIELD XM: CPT | Performed by: OPHTHALMOLOGY

## 2023-07-07 PROCEDURE — 92133 CPTRZD OPH DX IMG PST SGM ON: CPT | Performed by: OPHTHALMOLOGY

## 2023-07-07 ASSESSMENT — PACHYMETRY
OD_CT_UM: 557
OS_CT_CORRECTION: -1
OS_CT_UM: 557
OD_CT_CORRECTION: -1

## 2023-07-07 ASSESSMENT — REFRACTION_MANIFEST
OD_VA1: 20/30
OS_AXIS: 64
OS_SPHERE: -1.50
OD_VA1: 20/30
OD_SPHERE: -1.00
OD_CYLINDER: +0.50
OD_CYLINDER: +0.50
OS_SPHERE: -1.50
OD_AXIS: 140
OD_SPHERE: -1.25
OS_AXIS: 75
OS_ADD: +2.50
OD_AXIS: 121
OS_VA1: 20/40
OD_ADD: +2.50
OS_CYLINDER: +0.75
OS_VA1: 20/30+1
OS_CYLINDER: +0.50

## 2023-07-07 ASSESSMENT — CONFRONTATIONAL VISUAL FIELD TEST (CVF)
OS_FINDINGS: FULL
OD_FINDINGS: FULL

## 2023-07-07 ASSESSMENT — VISUAL ACUITY
OS_BCVA: 20/30
OD_BCVA: 20/30-2

## 2023-07-07 ASSESSMENT — REFRACTION_CURRENTRX
OD_OVR_VA: 20/
OS_OVR_VA: 20/

## 2023-07-07 ASSESSMENT — REFRACTION_AUTOREFRACTION
OD_CYLINDER: +0.50
OS_AXIS: 64
OD_SPHERE: -1.25
OS_SPHERE: -1.50
OS_CYLINDER: +0.50
OD_AXIS: 121

## 2023-07-07 ASSESSMENT — SPHEQUIV_DERIVED
OD_SPHEQUIV: -1
OS_SPHEQUIV: -1.25
OD_SPHEQUIV: -1
OD_SPHEQUIV: -0.75
OS_SPHEQUIV: -1.25
OS_SPHEQUIV: -1.125

## 2023-07-07 ASSESSMENT — TONOMETRY
OS_IOP_MMHG: 17
OD_IOP_MMHG: 17

## 2023-11-07 ENCOUNTER — OFFICE (OUTPATIENT)
Dept: URBAN - METROPOLITAN AREA CLINIC 30 | Facility: CLINIC | Age: 73
Setting detail: OPHTHALMOLOGY
End: 2023-11-07
Payer: MEDICARE

## 2023-11-07 DIAGNOSIS — E11.9: ICD-10-CM

## 2023-11-07 DIAGNOSIS — Z96.1: ICD-10-CM

## 2023-11-07 DIAGNOSIS — H40.1132: ICD-10-CM

## 2023-11-07 DIAGNOSIS — H47.233: ICD-10-CM

## 2023-11-07 PROCEDURE — 92250 FUNDUS PHOTOGRAPHY W/I&R: CPT | Performed by: OPHTHALMOLOGY

## 2023-11-07 PROCEDURE — 92014 COMPRE OPH EXAM EST PT 1/>: CPT | Performed by: OPHTHALMOLOGY

## 2023-11-07 ASSESSMENT — CONFRONTATIONAL VISUAL FIELD TEST (CVF)
OS_FINDINGS: FULL
OD_FINDINGS: FULL

## 2023-11-07 ASSESSMENT — SPHEQUIV_DERIVED
OS_SPHEQUIV: -1.25
OD_SPHEQUIV: -1
OS_SPHEQUIV: -1.125
OD_SPHEQUIV: -1
OD_SPHEQUIV: -0.75
OS_SPHEQUIV: -1.25

## 2023-11-07 ASSESSMENT — REFRACTION_MANIFEST
OS_SPHERE: -1.50
OS_AXIS: 64
OD_SPHERE: -1.00
OD_AXIS: 140
OS_SPHERE: -1.50
OS_VA1: 20/30+1
OD_AXIS: 121
OD_ADD: +2.50
OD_VA1: 20/30
OD_SPHERE: -1.25
OS_AXIS: 75
OD_CYLINDER: +0.50
OS_ADD: +2.50
OS_CYLINDER: +0.75
OS_VA1: 20/40
OS_CYLINDER: +0.50
OD_CYLINDER: +0.50
OD_VA1: 20/30

## 2023-11-07 ASSESSMENT — REFRACTION_AUTOREFRACTION
OS_CYLINDER: +0.50
OD_CYLINDER: +0.50
OD_AXIS: 121
OS_AXIS: 64
OS_SPHERE: -1.50
OD_SPHERE: -1.25

## 2023-11-07 ASSESSMENT — REFRACTION_CURRENTRX
OS_OVR_VA: 20/
OD_OVR_VA: 20/

## 2024-03-19 ENCOUNTER — OFFICE (OUTPATIENT)
Dept: URBAN - METROPOLITAN AREA CLINIC 30 | Facility: CLINIC | Age: 74
Setting detail: OPHTHALMOLOGY
End: 2024-03-19
Payer: MEDICARE

## 2024-03-19 DIAGNOSIS — H40.1132: ICD-10-CM

## 2024-03-19 DIAGNOSIS — Z96.1: ICD-10-CM

## 2024-03-19 DIAGNOSIS — E11.9: ICD-10-CM

## 2024-03-19 DIAGNOSIS — H47.233: ICD-10-CM

## 2024-03-19 PROCEDURE — 92083 EXTENDED VISUAL FIELD XM: CPT | Performed by: OPHTHALMOLOGY

## 2024-03-19 PROCEDURE — 92133 CPTRZD OPH DX IMG PST SGM ON: CPT | Performed by: OPHTHALMOLOGY

## 2024-03-19 PROCEDURE — 92012 INTRM OPH EXAM EST PATIENT: CPT | Performed by: OPHTHALMOLOGY

## 2024-03-19 ASSESSMENT — REFRACTION_MANIFEST
OD_AXIS: 121
OS_AXIS: 75
OS_SPHERE: -1.50
OD_AXIS: 140
OS_VA1: 20/40
OS_SPHERE: -1.50
OS_CYLINDER: +0.75
OD_CYLINDER: +0.50
OS_CYLINDER: +0.50
OD_SPHERE: -1.25
OS_ADD: +2.50
OD_VA1: 20/30
OD_VA1: 20/30
OD_CYLINDER: +0.50
OS_AXIS: 64
OS_VA1: 20/30+1
OD_SPHERE: -1.00
OD_ADD: +2.50

## 2024-03-19 ASSESSMENT — REFRACTION_CURRENTRX
OS_OVR_VA: 20/
OD_OVR_VA: 20/

## 2024-03-19 ASSESSMENT — SPHEQUIV_DERIVED
OS_SPHEQUIV: -1.125
OD_SPHEQUIV: -0.75
OD_SPHEQUIV: -1
OS_SPHEQUIV: -1.25

## 2024-09-03 ENCOUNTER — OFFICE (OUTPATIENT)
Dept: URBAN - METROPOLITAN AREA CLINIC 30 | Facility: CLINIC | Age: 74
Setting detail: OPHTHALMOLOGY
End: 2024-09-03
Payer: MEDICARE

## 2024-09-03 DIAGNOSIS — E11.9: ICD-10-CM

## 2024-09-03 DIAGNOSIS — H52.4: ICD-10-CM

## 2024-09-03 DIAGNOSIS — H47.233: ICD-10-CM

## 2024-09-03 DIAGNOSIS — H16.223: ICD-10-CM

## 2024-09-03 DIAGNOSIS — H40.1132: ICD-10-CM

## 2024-09-03 PROCEDURE — 92014 COMPRE OPH EXAM EST PT 1/>: CPT | Mod: 25 | Performed by: OPHTHALMOLOGY

## 2024-09-03 PROCEDURE — 68761 CLOSE TEAR DUCT OPENING: CPT | Mod: 50 | Performed by: OPHTHALMOLOGY

## 2024-09-03 PROCEDURE — 92250 FUNDUS PHOTOGRAPHY W/I&R: CPT | Performed by: OPHTHALMOLOGY

## 2024-09-03 PROCEDURE — 92015 DETERMINE REFRACTIVE STATE: CPT | Performed by: OPHTHALMOLOGY

## 2024-09-03 ASSESSMENT — CONFRONTATIONAL VISUAL FIELD TEST (CVF)
OD_FINDINGS: FULL
OS_FINDINGS: FULL

## 2024-12-06 ASSESSMENT — REFRACTION_MANIFEST
OD_SPHERE: -1.00
OS_SPHERE: -1.50
OS_AXIS: 75
OS_CYLINDER: +0.75
OD_VA1: 20/30
OS_ADD: +2.50
OD_ADD: +2.50
OD_CYLINDER: +0.50
OS_VA1: 20/40
OD_AXIS: 140

## 2024-12-06 ASSESSMENT — REFRACTION_CURRENTRX
OD_OVR_VA: 20/
OS_OVR_VA: 20/

## 2024-12-13 ENCOUNTER — OFFICE (OUTPATIENT)
Facility: LOCATION | Age: 74
Setting detail: OPHTHALMOLOGY
End: 2024-12-13
Payer: MEDICARE

## 2024-12-13 DIAGNOSIS — H16.223: ICD-10-CM

## 2024-12-13 DIAGNOSIS — H40.1132: ICD-10-CM

## 2024-12-13 PROCEDURE — 92012 INTRM OPH EXAM EST PATIENT: CPT | Performed by: OPHTHALMOLOGY

## 2024-12-13 ASSESSMENT — PACHYMETRY
OS_CT_CORRECTION: -1
OS_CT_UM: 557
OD_CT_CORRECTION: -1
OD_CT_UM: 557

## 2024-12-13 ASSESSMENT — REFRACTION_CURRENTRX
OS_OVR_VA: 20/
OD_OVR_VA: 20/

## 2024-12-13 ASSESSMENT — VISUAL ACUITY
OS_BCVA: 20/40
OD_BCVA: 20/40

## 2024-12-13 ASSESSMENT — REFRACTION_MANIFEST
OS_AXIS: 75
OS_ADD: +2.50
OD_CYLINDER: +0.50
OS_CYLINDER: +0.75
OD_AXIS: 140
OD_SPHERE: -1.00
OD_ADD: +2.50
OS_VA1: 20/40
OD_VA1: 20/30
OS_SPHERE: -1.50

## 2024-12-13 ASSESSMENT — CONFRONTATIONAL VISUAL FIELD TEST (CVF)
OS_FINDINGS: FULL
OD_FINDINGS: FULL

## 2024-12-13 ASSESSMENT — REFRACTION_AUTOREFRACTION
OD_SPHERE: -1.25
OD_AXIS: 121
OS_AXIS: 64
OS_SPHERE: -1.50
OD_CYLINDER: +0.50
OS_CYLINDER: +0.50

## 2024-12-13 ASSESSMENT — TONOMETRY
OS_IOP_MMHG: 17
OD_IOP_MMHG: 16

## 2025-03-04 ENCOUNTER — OFFICE (OUTPATIENT)
Facility: LOCATION | Age: 75
Setting detail: OPHTHALMOLOGY
End: 2025-03-04
Payer: MEDICARE

## 2025-03-04 DIAGNOSIS — H52.4: ICD-10-CM

## 2025-03-04 DIAGNOSIS — H47.233: ICD-10-CM

## 2025-03-04 DIAGNOSIS — H16.223: ICD-10-CM

## 2025-03-04 DIAGNOSIS — H40.1132: ICD-10-CM

## 2025-03-04 DIAGNOSIS — Z96.1: ICD-10-CM

## 2025-03-04 DIAGNOSIS — E11.9: ICD-10-CM

## 2025-03-04 PROCEDURE — 68761 CLOSE TEAR DUCT OPENING: CPT | Mod: 50 | Performed by: OPHTHALMOLOGY

## 2025-03-04 PROCEDURE — 92015 DETERMINE REFRACTIVE STATE: CPT | Performed by: OPHTHALMOLOGY

## 2025-03-04 PROCEDURE — 92014 COMPRE OPH EXAM EST PT 1/>: CPT | Mod: 25 | Performed by: OPHTHALMOLOGY

## 2025-03-04 PROCEDURE — 92083 EXTENDED VISUAL FIELD XM: CPT | Performed by: OPHTHALMOLOGY

## 2025-03-04 PROCEDURE — 92133 CPTRZD OPH DX IMG PST SGM ON: CPT | Performed by: OPHTHALMOLOGY

## 2025-03-04 ASSESSMENT — VISUAL ACUITY
OS_BCVA: 20/40-1
OD_BCVA: 20/40-1

## 2025-03-04 ASSESSMENT — REFRACTION_MANIFEST
OS_AXIS: 75
OD_AXIS: 140
OD_SPHERE: -1.00
OD_ADD: +2.50
OS_CYLINDER: +0.75
OS_SPHERE: -1.50
OS_ADD: +2.50
OS_VA1: 20/40
OD_CYLINDER: +0.50
OD_VA1: 20/30

## 2025-03-04 ASSESSMENT — PACHYMETRY
OS_CT_CORRECTION: -1
OD_CT_UM: 557
OS_CT_UM: 557
OD_CT_CORRECTION: -1

## 2025-03-04 ASSESSMENT — CONFRONTATIONAL VISUAL FIELD TEST (CVF)
OD_FINDINGS: FULL
OS_FINDINGS: FULL

## 2025-03-04 ASSESSMENT — REFRACTION_AUTOREFRACTION
OD_SPHERE: -1.25
OS_CYLINDER: +0.50
OD_CYLINDER: +0.50
OD_AXIS: 121
OS_AXIS: 64
OS_SPHERE: -1.50

## 2025-03-04 ASSESSMENT — TONOMETRY
OS_IOP_MMHG: 16
OD_IOP_MMHG: 16

## 2025-03-04 ASSESSMENT — REFRACTION_CURRENTRX
OD_OVR_VA: 20/
OS_OVR_VA: 20/

## 2025-08-19 ENCOUNTER — OFFICE (OUTPATIENT)
Facility: LOCATION | Age: 75
Setting detail: OPHTHALMOLOGY
End: 2025-08-19
Payer: MEDICARE

## 2025-08-19 DIAGNOSIS — E11.9: ICD-10-CM

## 2025-08-19 DIAGNOSIS — H40.1132: ICD-10-CM

## 2025-08-19 DIAGNOSIS — H47.233: ICD-10-CM

## 2025-08-19 DIAGNOSIS — Z96.1: ICD-10-CM

## 2025-08-19 DIAGNOSIS — H16.223: ICD-10-CM

## 2025-08-19 PROCEDURE — 92012 INTRM OPH EXAM EST PATIENT: CPT | Performed by: OPHTHALMOLOGY

## 2025-08-19 PROCEDURE — 92083 EXTENDED VISUAL FIELD XM: CPT | Performed by: OPHTHALMOLOGY

## 2025-08-19 ASSESSMENT — REFRACTION_CURRENTRX
OS_ADD: +2.50
OS_CYLINDER: +0.75
OD_CYLINDER: +0.50
OD_OVR_VA: 20/
OD_AXIS: 142
OS_AXIS: 74
OS_OVR_VA: 20/
OD_SPHERE: -1.00
OS_SPHERE: -1.50
OD_ADD: +2.50

## 2025-08-19 ASSESSMENT — TONOMETRY
OS_IOP_MMHG: 16
OD_IOP_MMHG: 16

## 2025-08-19 ASSESSMENT — REFRACTION_MANIFEST
OD_ADD: +2.50
OS_AXIS: 75
OS_ADD: +2.50
OS_CYLINDER: +0.75
OS_SPHERE: -1.50
OS_VA1: 20/40
OD_AXIS: 140
OD_SPHERE: -1.00
OD_VA1: 20/30
OD_CYLINDER: +0.50

## 2025-08-19 ASSESSMENT — PACHYMETRY
OS_CT_UM: 557
OS_CT_CORRECTION: -1
OD_CT_UM: 557
OD_CT_CORRECTION: -1

## 2025-08-19 ASSESSMENT — REFRACTION_AUTOREFRACTION
OS_AXIS: 64
OS_CYLINDER: +0.50
OD_CYLINDER: +0.50
OD_AXIS: 121
OS_SPHERE: -1.50
OD_SPHERE: -1.25

## 2025-08-19 ASSESSMENT — CONFRONTATIONAL VISUAL FIELD TEST (CVF)
OS_FINDINGS: FULL
OD_FINDINGS: FULL

## 2025-08-19 ASSESSMENT — VISUAL ACUITY
OD_BCVA: 20/40-2
OS_BCVA: 20/40